# Patient Record
Sex: MALE | Race: WHITE | NOT HISPANIC OR LATINO | Employment: STUDENT | URBAN - METROPOLITAN AREA
[De-identification: names, ages, dates, MRNs, and addresses within clinical notes are randomized per-mention and may not be internally consistent; named-entity substitution may affect disease eponyms.]

---

## 2019-05-17 ENCOUNTER — OFFICE VISIT (OUTPATIENT)
Dept: ORTHOPEDIC SURGERY | Facility: CLINIC | Age: 19
End: 2019-05-17

## 2019-05-17 VITALS — HEART RATE: 98 BPM | BODY MASS INDEX: 19.87 KG/M2 | HEIGHT: 73 IN | OXYGEN SATURATION: 98 % | WEIGHT: 149.91 LBS

## 2019-05-17 DIAGNOSIS — M25.562 ACUTE PAIN OF LEFT KNEE: Primary | ICD-10-CM

## 2019-05-17 DIAGNOSIS — M25.462 EFFUSION OF LEFT KNEE: ICD-10-CM

## 2019-05-17 DIAGNOSIS — M25.362 PATELLAR INSTABILITY OF LEFT KNEE: ICD-10-CM

## 2019-05-17 DIAGNOSIS — S83.002A SUBLUXATION OF LEFT PATELLA, INITIAL ENCOUNTER: ICD-10-CM

## 2019-05-17 PROCEDURE — 99203 OFFICE O/P NEW LOW 30 MIN: CPT | Performed by: PHYSICIAN ASSISTANT

## 2019-05-17 NOTE — PROGRESS NOTES
[facility] Orthopaedic Surgery Clinic Note    Subjective     Pain of the Left Knee (Strain of Left Patellar Tendon)      HPI Jared Brunner is a 19 y.o. male.  Patient presents orthopedic clinic for evaluation of his left knee.  He reports on 5/13/2019 he was dancing in his kitchen when he twisted and felt his patella go laterally.  He stopped before it completely dislocated and pushed it back medially.  He was then seen at an urgent care the next day secondary to pain swelling and the sensation of instability.  At this time he endorses pain scale 3/10.  Severity of pain mild to moderate.  Quality of pain dull, aching.  Associated symptoms swelling, popping, stiffness and giving way.  Symptoms are worse with walking, standing and stair climbing.  Patient's been utilizing ice and ibuprofen for pain control.  No reported radiculopathy or paresthesias.  Patient denies any mechanical symptoms such as locking or catching to the knee.  No reported prior history of injury or trauma.  Patient denies any fever, chills, night sweats or other constitutional symptoms.      History reviewed. No pertinent past medical history.   History reviewed. No pertinent surgical history.   Family History   Problem Relation Age of Onset   • No Known Problems Mother    • No Known Problems Father    • No Known Problems Sister    • No Known Problems Brother    • No Known Problems Maternal Aunt    • No Known Problems Maternal Uncle    • No Known Problems Paternal Aunt    • No Known Problems Paternal Uncle    • No Known Problems Maternal Grandmother    • No Known Problems Maternal Grandfather    • No Known Problems Paternal Grandmother    • No Known Problems Paternal Grandfather    • Anesthesia problems Neg Hx    • Broken bones Neg Hx    • Cancer Neg Hx    • Clotting disorder Neg Hx    • Collagen disease Neg Hx    • Diabetes Neg Hx    • Dislocations Neg Hx    • Osteoporosis Neg Hx    • Rheumatologic disease Neg Hx    • Scoliosis Neg Hx    •  "Severe sprains Neg Hx      Social History     Socioeconomic History   • Marital status: Single     Spouse name: Not on file   • Number of children: Not on file   • Years of education: Not on file   • Highest education level: Not on file   Tobacco Use   • Smoking status: Former Smoker     Packs/day: 1.00   • Smokeless tobacco: Never Used   Substance and Sexual Activity   • Alcohol use: Yes     Frequency: Never     Comment: OCC   • Drug use: No   • Sexual activity: No      No current outpatient medications on file prior to visit.     No current facility-administered medications on file prior to visit.       No Known Allergies     The following portions of the patient's history were reviewed and updated as appropriate: allergies, current medications, past family history, past medical history, past social history, past surgical history and problem list.    Review of Systems   Constitutional: Negative.    HENT: Negative.    Eyes: Negative.    Respiratory: Negative.    Cardiovascular: Positive for leg swelling.   Gastrointestinal: Negative.    Endocrine: Negative.    Genitourinary: Negative.    Musculoskeletal: Positive for joint swelling.   Skin: Negative.    Allergic/Immunologic: Negative.    Neurological: Negative.    Hematological: Negative.    Psychiatric/Behavioral: Negative.         Objective      Physical Exam  Pulse 98   Ht 185.4 cm (72.99\")   Wt 68 kg (149 lb 14.6 oz)   SpO2 98%   BMI 19.78 kg/m²     Body mass index is 19.78 kg/m².    General  Mental Status - alert  General Appearance - cooperative, well groomed, not in acute distress  Orientation - Oriented X3  Build & Nutrition - well developed and well nourished  Posture - normal posture  Gait -slightly antalgic      Integumentary  Global Assessment  Examination of related systems reveals - no lymphadenopathy  Ears:  No abnormality  Nose:  No mucous drainage  General Characteristics  Overall examination of the patient's skin reveals - no rashes, no " evidence of scars, no suspicious lesions and no bruises.  Color - normal coloration of skin.  Vascular: Brisk capillary refill in all extremities    Ortho Exam  Peripheral Vascular:    Upper Extremity:   Inspection:  Left--no cyanotic nail beds Right--no cyanotic nail beds   Bilateral:  Pink nail beds with brisk capillary refill   Palpation:  Bilateral radial pulse normal    Musculoskeletal:  Global Assessment:  Overall assessment of Lower Extremity Muscle Strength and Tone:  Left quadriceps--4/5, positive atrophy noted   Left hamstrings--5/5       Left tibialis anterior--5/5  Left gastroc-soleus--5/5  Left EHL--5/5      Lower Extremity:  Knee/Patella:  No digital clubbing or cyanosis.    Examination of left knee reveals:  Normal deep tendon reflexes, coordination, strength, tone, sensation.  No known fractures or deformities.    Inspection and Palpation:    Left knee:  Tenderness: Karolyn-and retropatellar tenderness.  No joint line tenderness  Effusion: 2+  Crepitus:  none  Pulses:  2+  Ecchymosis:  None  Warmth:  None     ROM:  Right:  Extension:0    Flexion:135  Left:  Extension:0     Flexion:115    Instability:    Left:  Lachman Test:  Negative, Varus stress test negative, Valgus stress test negative   Anterior Drawer Test:  Negative, Posterior Drawer Test:  Negative    Deformities/Malalignments/Discrepancies:    Left:  none  Right:  none    Functional Testing:  Left:  Isamar's test:  Negative  Patella grind test: Positive  Q-angle:  Normal  Apprehension Sign: Positive  Straight leg raise: Intact      Imaging/Studies    Imaging Results (last 24 hours)     Procedure Component Value Units Date/Time    XR Knee 3+ View With [place] Left [[id number]] Resulted:  05/17/19 1027     Updated:  05/17/19 1028    Narrative:       Knee X-Ray  Indication: Pain    Upright AP of bilateral knees. Lateral, skiers and Sunrise views of left   knee     Findings: Patella pablo with ossicle of the tibial tubercle  No fracture  No bony  lesion  Normal soft tissues  Normal joint spaces    No prior studies were available for comparison.          Ordered plain film imaging of the left knee.  Images reviewed with Dr. Zacarias.  No acute bony abnormality, fracture or dislocation.  Positive patella pablo.  Positive ossicle noted of the tibial tubercle as well.  See chart for official report.      Assessment:  1. Acute pain of left knee    2. Effusion of left knee    3. Patellar instability of left knee    4. Subluxation of left patella, initial encounter        Plan:  1. Patient with acute left knee pain and effusion along with patellar instability and patellar subluxation.  2. Placed in a patellar stabilizing brace today.  3. Referred to formal physical therapy.  4. Recommend continue use of ibuprofen for inflammation/pain control.  5. Avoid running jumping and high impact activities for now.  6. Patient understands that he had a near complete patella dislocation and he is at a high risk for repeat.  7. Follow-up in 4 weeks for repeat evaluation, sooner if issues arise or symptoms worsen/change.  8. Questions and concerns answered.    Case was discussed with Dr. Zacarias who agreed with the above assessment and plan.    Medical Decision Making  Management Options : over-the-counter medicine and physical/occupational therapy  Data/Risk: radiology tests       Monika Dickinson PA-C  05/17/19  12:24 PM

## 2019-05-21 ENCOUNTER — APPOINTMENT (OUTPATIENT)
Dept: PHYSICAL THERAPY | Facility: HOSPITAL | Age: 19
End: 2019-05-21

## 2019-05-22 ENCOUNTER — HOSPITAL ENCOUNTER (OUTPATIENT)
Dept: PHYSICAL THERAPY | Facility: HOSPITAL | Age: 19
Setting detail: THERAPIES SERIES
Discharge: HOME | End: 2019-05-22

## 2019-05-22 DIAGNOSIS — M25.562 ACUTE PAIN OF LEFT KNEE: Primary | ICD-10-CM

## 2019-05-22 PROCEDURE — 97161 PT EVAL LOW COMPLEX 20 MIN: CPT | Performed by: PHYSICAL THERAPIST

## 2019-05-22 NOTE — THERAPY EVALUATION
"    Outpatient Physical Therapy Ortho Initial Evaluation  [facility]     Patient Name: Jared Brunner  : 2000  MRN: [id number]  Today's Date: 2019      Visit Date: 2019    There is no problem list on file for this patient.       No past medical history on file.     No past surgical history on file.    Visit Dx:     ICD-10-CM ICD-9-CM   1. Acute pain of left knee M25.562 719.46         Patient History     Row Name 19 0700             History    Chief Complaint  Pain  -CP      Type of Pain  Knee pain  -CP      Date Current Problem(s) Began  19  -CP      Brief Description of Current Complaint  Pt states he was \"running around in the kitchen\", had pain in left knee and \"had to push kneecap back in place medially.\" Pt states he had immediate pain, swelling, f/u with urgent care for knee pain. Pt then f/u with ortho, given knee brace; however states he hasn't been wearing brace often. He reports intermittent buckling in left knee, continued swelling.   -CP      Patient/Caregiver Goals  Relieve pain  -CP      Current Tobacco Use  no  -CP      Smoking Status  no  -CP      Patient's Rating of General Health  Very good  -CP      Hand Dominance  right-handed  -CP      Occupation/sports/leisure activities  Pt works as a  for [employer]. Pt states has 2 flights of stairs at home, enjoys music and used to like to play baseball/softball.   -CP      Patient seeing anyone else for problem(s)?  Monika Dickinson  -CP      How has patient tried to help current problem?  rest, knee brace  -CP      What clinical tests have you had for this problem?  X-ray  -CP      Results of Clinical Tests  See official report for results. Negative for fracture or acute abnormality. Positive patella pablo.  Positive ossicle noted of the tibial tubercle as well. Positive patella pablo.  Positive ossicle noted of the tibial tubercle as well.Positive patella pablo.  Positive ossicle noted of the tibial tubercle as well. " "Positive patella pablo.  Positive ossicle noted of the tibial tubercle as well  -CP      History of Previous Related Injuries  no  -CP         Pain     Pain Location  Knee  -CP      Pain at Present  0  -CP      Pain at Best  0  -CP      Pain at Worst  7  -CP      Pain Frequency  Intermittent  -CP      Pain Description  Aching  -CP      What Performance Factors Make the Current Problem(s) WORSE?  going down stairs  -CP      Is your sleep disturbed?  No  -CP      Is medication used to assist with sleep?  No  -CP         Fall Risk Assessment    Any falls in the past year:  No  -CP         Daily Activities    Primary Language  English  -CP      Pt Participated in POC and Goals  Yes  -CP         Safety    Are you being hurt, hit, or frightened by anyone at home or in your life?  No  -CP        User Key  (r) = Recorded By, (t) = Taken By, (c) = Cosigned By    Initials Name Provider Type    CP Perkins, Corinne E, PT Physical Therapist          PT Ortho     Row Name 05/22/19 0700       Subjective Comments    Subjective Comments  Pt presents with c/o left knee pain, states \"almost dislocated his kneecap.\"   -CP       Subjective Pain    Able to rate subjective pain?  yes  -CP    Pre-Treatment Pain Level  0  -CP    Post-Treatment Pain Level  0  -CP       Posture/Observations    Posture/Observations Comments  swelling noted along left knee; slight apprehension with patellar glides  -CP       General ROM    RT Lower Ext  Rt Knee Extension/Flexion  -CP    LT Lower Ext  Lt Knee Extension/Flexion  -CP       Right Lower Ext    Rt Knee Extension/Flexion AROM  0-135  -CP       Left Lower Ext    Lt Knee Extension/Flexion AROM  0-5-128 pain both end ranges  -CP       MMT (Manual Muscle Testing)    Rt Lower Ext  Rt Hip WNL;Rt Knee WNL;Rt Ankle WNL  -CP    Lt Lower Ext  Lt Hip Flexion;Lt Hip Extension;Lt Hip ABduction;Lt Hip ADduction;Lt Knee Extension;Lt Knee Flexion  -CP       MMT Left Lower Ext    Lt Hip Flexion MMT, Gross Movement  " (4+/5) good plus  -CP    Lt Hip Extension MMT, Gross Movement  (4+/5) good plus  -CP    Lt Hip ABduction MMT, Gross Movement  (4+/5) good plus  -CP    Lt Hip ADduction MMT, Gross Movement  (4+/5) good plus  -CP    Lt Knee Extension MMT, Gross Movement  (3-/5) fair minus  -CP    Lt Knee Flexion MMT, Gross Movement  (4/5) good  -CP    Lt Lower Extremity Comments   unable to perform SLR, pain with quad set  -CP       Sensation    Light Touch  No apparent deficits  -CP       Girth    Girth Measured?  Left Lower Extremity  -CP       LLE Quick Girth (cm)    Mid patella  38 cm  -CP    Other 1  33 cm inferior patellar pole  -CP       Gait/Stairs Assessment/Training    Handrail Location (Stairs)  both sides  -CP    Number of Steps (Stairs)  13  -CP    Ascending Technique (Stairs)  step-over-step  -CP    Descending Technique (Stairs)  step-over-step increased use of BUE to descend stairs  -CP    Comment (Gait/Stairs)  Deneis pain with walking.   -CP      User Key  (r) = Recorded By, (t) = Taken By, (c) = Cosigned By    Initials Name Provider Type    CP Perkins, Corinne E, PT Physical Therapist                            PT OP Goals     Row Name 05/22/19 0800          PT Short Term Goals    STG Date to Achieve  06/05/19  -CP     STG 1  Patient will increase knee PROM to improve functional mobility to 0-2 degrees extension and 130 degrees flexion.  -CP     STG 1 Progress  New  -CP     STG 2  Patient will demonstrate an independent HEP for core and knee strength and flexibility/ROM.  -CP     STG 2 Progress  New  -CP     STG 3  Patient will report decreased pain rating on VAS by at least 50% with recreational activities, like climbing stairs, walking, jogging.   -CP     STG 3 Progress  New  -CP        Long Term Goals    LTG Date to Achieve  06/21/19  -CP     LTG 1  Patient will demonstrate improved functional outcome measure by 15 points  -CP     LTG 1 Progress  New  -CP     LTG 2  Patient will demonstrate independent HEP  progressed for closed chain strength, proprioception, balance and agility with minimal or no compensations or exacerbations  -CP     LTG 2 Progress  New  -CP     LTG 3  Patient right knee tracking is grossly symmetrical to that of left knee, without increased pain with squatting, walking, or with mobility on stairs.  -CP     LTG 3 Progress  New  -CP     LTG 4  Pt demonstrate functional L knee AROM 0-135 deg pain free.   -CP     LTG 4 Progress  New  -CP        Time Calculation    PT Goal Re-Cert Due Date  08/20/19  -CP       User Key  (r) = Recorded By, (t) = Taken By, (c) = Cosigned By    Initials Name Provider Type    CP Perkins, Corinne E, PT Physical Therapist          PT Assessment/Plan     Row Name 05/22/19 0800          PT Assessment    Functional Limitations  Decreased safety during functional activities;Impaired gait;Limitation in home management;Limitations in community activities;Performance in leisure activities  -CP     Impairments  Gait;Balance;Impaired flexibility;Range of motion;Pain;Muscle strength;Joint mobility  -CP     Assessment Comments  Pt is a 20 yo male referred to PT for acute left knee pain after subluxation of left patella with residual knee effusion. Pt would benefit from skilled PT, including ther exercises, manual, NMRE, and modalities as indicated to decrease pain, improve stabilization, improve ROM, and return to PLOF.   -CP     Please refer to paper survey for additional self-reported information  Yes  -CP     Rehab Potential  Good  -CP     Patient/caregiver participated in establishment of treatment plan and goals  Yes  -CP     Patient would benefit from skilled therapy intervention  Yes  -CP        PT Plan    PT Frequency  1x/week  -CP     Predicted Duration of Therapy Intervention (Therapy Eval)  4-6 visits  -CP     Planned CPT's?  PT EVAL LOW COMPLEXITY: 02923;PT RE-EVAL: 07030;PT THER PROC EA 15 MIN: 55088;PT MANUAL THERAPY EA 15 MIN: 42763;PT NEUROMUSC RE-EDUCATION EA 15 MIN:  "16375;PT GAIT TRAINING EA 15 MIN: 12181;PT ELECTRICAL STIM UNATTEND: ;PT ULTRASOUND EA 15 MIN: 14221;PT HOT/COLD PACK WC NONMCARE: 99965;PT IONTOPHORESIS EA 15 MIN: 98170  -CP     PT Plan Comments  Assess compliance with HEP for improved AROM and quad set. Progress in clinic as tolerated for improved mobility, hip/knee strength especially quad and VMO, and modalities as indicated for pain/swelling mgmt.   -CP       User Key  (r) = Recorded By, (t) = Taken By, (c) = Cosigned By    Initials Name Provider Type    CP Perkins, Corinne E, PT Physical Therapist            Exercises     Row Name 05/22/19 0700             Subjective Comments    Subjective Comments  Pt presents with c/o left knee pain, states \"almost dislocated his kneecap.\"   -CP         Subjective Pain    Able to rate subjective pain?  yes  -CP      Pre-Treatment Pain Level  0  -CP      Post-Treatment Pain Level  0  -CP        User Key  (r) = Recorded By, (t) = Taken By, (c) = Cosigned By    Initials Name Provider Type    CP Perkins, Corinne E, PT Physical Therapist                        Outcome Measure Options: Lower Extremity Functional Scale (LEFS)  Lower Extremity Functional Index  Any of your usual work, housework or school activities: A little bit of difficulty  Your usual hobbies, recreational or sporting activities: Extreme difficulty or unable to perform activity  Getting into or out of the bath: A little bit of difficulty  Walking between rooms: No difficulty  Putting on your shoes or socks: No difficulty  Squatting: Extreme difficulty or unable to perform activity  Lifting an object, like a bag of groceries from the floor: No difficulty  Performing light activities around your home: No difficulty  Performing heavy activities around your home: Moderate difficulty  Getting into or out of a car: A little bit of difficulty  Walking 2 blocks: Quite a bit of difficulty  Walking a mile: Quite a bit of difficulty  Going up or down 10 stairs (about " 1 flight of stairs): A little bit of difficulty  Standing for 1 hour: A little bit of difficulty  Sitting for 1 hour: No difficulty  Running on even ground: Extreme difficulty or unable to perform activity  Running on uneven ground: Extreme difficulty or unable to perform activity  Making sharp turns while running fast: Extreme difficulty or unable to perform activity  Hopping: Quite a bit of difficulty  Rolling over in bed: No difficulty  Total: 44      Time Calculation:     Start Time: 0755     Therapy Charges for Today     Code Description Service Date Service Provider Modifiers Qty    51550256553 HC PT EVAL LOW COMPLEXITY 3 5/22/2019 Perkins, Corinne E, PT GP 1          PT G-Codes  Outcome Measure Options: Lower Extremity Functional Scale (LEFS)  Total: 44         Corinne E. Perkins, PT  5/22/2019

## 2019-06-10 ENCOUNTER — TELEPHONE (OUTPATIENT)
Dept: ORTHOPEDIC SURGERY | Facility: CLINIC | Age: 19
End: 2019-06-10

## 2019-06-10 NOTE — TELEPHONE ENCOUNTER
I CALLED PATIENT TO REMIND HIM OF HIS MISSED APPOINTMENT TODAY. HE SAID HE GOT A NEW JOB AND CAN ONLY COME IN ON THURSDAYS. HE HAS BEEN SEEN ON MONDAYS AND FRIDAYS, BUT NOT THURSDAYS. IS THIS OKAY?

## 2019-06-10 NOTE — TELEPHONE ENCOUNTER
If the patient is doing better and does not believe he needs to be seen that's fine.  Otherwise I can see him on a Thursday to accommodate his work schedule.

## 2019-06-26 ENCOUNTER — DOCUMENTATION (OUTPATIENT)
Dept: PHYSICAL THERAPY | Facility: HOSPITAL | Age: 19
End: 2019-06-26

## 2019-06-26 DIAGNOSIS — M25.562 ACUTE PAIN OF LEFT KNEE: Primary | ICD-10-CM

## 2019-06-26 NOTE — THERAPY DISCHARGE NOTE
Outpatient Physical Therapy Discharge Summary         Patient Name: Jared Brunner  : 2000  MRN: [id number]    Today's Date: 2019    Visit Dx:    ICD-10-CM ICD-9-CM   1. Acute pain of left knee M25.562 719.46       PT OP Goals     Row Name 19 1300          PT Short Term Goals    STG Date to Achieve  19  -CP     STG 1  Patient will increase knee PROM to improve functional mobility to 0-2 degrees extension and 130 degrees flexion.  -CP     STG 1 Progress  Not Met  -CP     STG 2  Patient will demonstrate an independent HEP for core and knee strength and flexibility/ROM.  -CP     STG 2 Progress  Not Met  -CP     STG 3  Patient will report decreased pain rating on VAS by at least 50% with recreational activities, like climbing stairs, walking, jogging.   -CP     STG 3 Progress  Not Met  -CP        Long Term Goals    LTG Date to Achieve  19  -CP     LTG 1  Patient will demonstrate improved functional outcome measure by 15 points  -CP     LTG 1 Progress  Not Met  -CP     LTG 2  Patient will demonstrate independent HEP progressed for closed chain strength, proprioception, balance and agility with minimal or no compensations or exacerbations  -CP     LTG 2 Progress  Not Met  -CP     LTG 3  Patient right knee tracking is grossly symmetrical to that of left knee, without increased pain with squatting, walking, or with mobility on stairs.  -CP     LTG 3 Progress  Not Met  -CP     LTG 4  Pt demonstrate functional L knee AROM 0-135 deg pain free.   -CP     LTG 4 Progress  Not Met  -CP       User Key  (r) = Recorded By, (t) = Taken By, (c) = Cosigned By    Initials Name Provider Type    CP Perkins, Corinne E, PT Physical Therapist          OP PT Discharge Summary  Date of Discharge: 19  Reason for Discharge: Non-compliant  Outcomes Achieved: Other(unable to make progress; noncompliant with PT)  Discharge Instructions/Additional Comments: Patient was seen for PT IE only. He had 2  cancellations and 1 no show, did not f/u with therapy as expected. Pt will be d/c from OPPT due to lack of compliance.       Time Calculation:                    Corinne E. Perkins, PT  6/26/2019